# Patient Record
Sex: MALE | Race: WHITE | NOT HISPANIC OR LATINO | Employment: FULL TIME | URBAN - METROPOLITAN AREA
[De-identification: names, ages, dates, MRNs, and addresses within clinical notes are randomized per-mention and may not be internally consistent; named-entity substitution may affect disease eponyms.]

---

## 2024-05-14 ENCOUNTER — OFFICE VISIT (OUTPATIENT)
Dept: PRIMARY CARE | Facility: CLINIC | Age: 29
End: 2024-05-14
Payer: COMMERCIAL

## 2024-05-14 VITALS
OXYGEN SATURATION: 98 % | SYSTOLIC BLOOD PRESSURE: 118 MMHG | HEIGHT: 74 IN | RESPIRATION RATE: 16 BRPM | BODY MASS INDEX: 29.9 KG/M2 | DIASTOLIC BLOOD PRESSURE: 72 MMHG | HEART RATE: 60 BPM | WEIGHT: 233 LBS | TEMPERATURE: 98 F

## 2024-05-14 DIAGNOSIS — Z00.00 ROUTINE HEALTH MAINTENANCE: Primary | ICD-10-CM

## 2024-05-14 DIAGNOSIS — Z72.0 TOBACCO USE: ICD-10-CM

## 2024-05-14 DIAGNOSIS — N50.89 MASS OF RIGHT TESTIS: ICD-10-CM

## 2024-05-14 PROBLEM — H69.90 EUSTACHIAN TUBE DYSFUNCTION: Status: ACTIVE | Noted: 2024-05-14

## 2024-05-14 PROCEDURE — 99385 PREV VISIT NEW AGE 18-39: CPT | Performed by: STUDENT IN AN ORGANIZED HEALTH CARE EDUCATION/TRAINING PROGRAM

## 2024-05-14 ASSESSMENT — ENCOUNTER SYMPTOMS
LIGHT-HEADEDNESS: 0
DIZZINESS: 0
DIFFICULTY URINATING: 0
VOMITING: 0
NAUSEA: 0
MYALGIAS: 0
DIAPHORESIS: 0
CHILLS: 0
HEMATURIA: 0
DIARRHEA: 0
FEVER: 0
FLANK PAIN: 0
DYSURIA: 0
SHORTNESS OF BREATH: 0

## 2024-05-14 NOTE — PROGRESS NOTES
"Subjective   Patient ID: Brenton Boone is a 29 y.o. male who presents for Establish Care.  HPI    He is here to establish care.     Found lump on right testicle posteriorly. Noticed it a few weeks ago. It is not tender. He doesn't think it is growing. No hematospermia, hematuria, no pain with ejaculation.     PMHx: Reports pancreatitis in 2nd grade.  PSHx: Reports tonsillectomy 9 years ago.  FMHx: CHF in maternal grandmother (), HTN in father and hyperactive thyroid in mother. Denies history of prostate cancer or colon cancer in first degree relatives, his maternal grandmother may have had colon cancer at 85 but he is not sure. His siblings are healthy.    Review of Systems   Constitutional:  Negative for chills, diaphoresis and fever.   HENT:  Negative for ear pain and hearing loss.    Eyes:  Negative for visual disturbance.   Respiratory:  Negative for shortness of breath.    Cardiovascular:  Negative for chest pain.   Gastrointestinal:  Negative for diarrhea, nausea and vomiting.   Endocrine: Negative for cold intolerance and heat intolerance.   Genitourinary:  Negative for difficulty urinating, dysuria, flank pain, genital sores, hematuria, penile discharge, penile pain, penile swelling, scrotal swelling, testicular pain and urgency.   Musculoskeletal:  Negative for myalgias.   Skin:  Negative for rash.   Neurological:  Negative for dizziness and light-headedness.       /72   Pulse 60   Temp 36.7 °C (98 °F)   Resp 16   Ht 1.88 m (6' 2\")   Wt 106 kg (233 lb)   SpO2 98%   BMI 29.92 kg/m²     Objective   Physical Exam  Vitals reviewed.   Constitutional:       General: He is not in acute distress.     Appearance: Normal appearance.   HENT:      Head: Normocephalic.      Right Ear: Tympanic membrane, ear canal and external ear normal. There is no impacted cerumen.      Left Ear: Tympanic membrane, ear canal and external ear normal. There is no impacted cerumen.      Mouth/Throat:      Mouth: " Mucous membranes are moist.      Pharynx: Oropharynx is clear. No oropharyngeal exudate or posterior oropharyngeal erythema.   Cardiovascular:      Rate and Rhythm: Normal rate and regular rhythm.   Pulmonary:      Effort: Pulmonary effort is normal. No respiratory distress.      Breath sounds: Normal breath sounds.   Abdominal:      General: Bowel sounds are normal. There is no distension.      Palpations: Abdomen is soft. There is no mass.      Tenderness: There is no abdominal tenderness. There is no guarding or rebound.   Genitourinary:     Penis: Normal.       Comments: No inguinal hernias bilaterally.    Right superior testicular fullness present  Musculoskeletal:         General: No deformity.      Cervical back: Neck supple. No tenderness.   Lymphadenopathy:      Cervical: No cervical adenopathy.   Skin:     Coloration: Skin is not jaundiced.   Neurological:      Mental Status: He is alert.         Assessment/Plan   Problem List Items Addressed This Visit       Tobacco use    Mass of right testis    Relevant Orders    US scrotum    Referral to Urology    Routine health maintenance - Primary    Relevant Orders    Lipid Panel    Comprehensive Metabolic Panel    CBC    TSH with reflex to Free T4 if abnormal       Tobacco use  -He will work on cessation and f/u with me in 1-3 months if needed  I spent more than 3 minutes (but less than 10 minutes) counseling patient about need for smoking/tobacco cessation and how I can support efforts when patient is ready to quit.  Discussed nicotine replacement therapy, Varenicline, Bupropion, hypnosis, support groups, and acupunture as potential options.  Patient currently has no signs or symptoms of tobacco related disease.    Right testicular mass  -Scrotal u/s and refer to urology    Health Maintenance  -Prostate Cancer Screening: Age 52  -Vaccinations: TDAP done 2020, due 2030. Advised flu vaccine after labor day and covid booster.   -Lung Cancer Screening: Not  indicated  -AAA Screening: Not indicated  -Colonoscopy:Age 47  -Screen for HIV/Hep C: He deferred    CPE completed.  Advised to keep a heart healthy, low fat  diet with fruits and veggies like Mediterranean diet.  Advised on the importance of exercise and maintaining 150 minutes of exercise per week (30 minutes per day 5 days a week).  Advised on regular eye and dental visits.  Discussed age appropriate cancer screening, immunizations and recommendations given.  Discussed avoiding illicit drugs and tobacco. Advised on appropriate use of alcohol.  Advised to wear seat belt.    Works as . Works is going well. At home with lauren. Getting  in July. He runs and lifts.     F/u 1-3 months for tobacco cessation if needed  CPE 1 year  Fasting labs ordered

## 2024-05-21 ENCOUNTER — HOSPITAL ENCOUNTER (OUTPATIENT)
Dept: RADIOLOGY | Facility: CLINIC | Age: 29
Discharge: HOME | End: 2024-05-21
Payer: COMMERCIAL

## 2024-05-21 DIAGNOSIS — N50.89 MASS OF RIGHT TESTIS: ICD-10-CM

## 2024-05-21 PROCEDURE — 76870 US EXAM SCROTUM: CPT | Performed by: RADIOLOGY

## 2024-05-21 PROCEDURE — 76870 US EXAM SCROTUM: CPT

## 2024-05-23 DIAGNOSIS — N50.89 MASS OF RIGHT TESTIS: Primary | ICD-10-CM

## 2024-05-28 ENCOUNTER — APPOINTMENT (OUTPATIENT)
Dept: PRIMARY CARE | Facility: CLINIC | Age: 29
End: 2024-05-28
Payer: COMMERCIAL

## 2024-06-26 ENCOUNTER — OFFICE VISIT (OUTPATIENT)
Dept: UROLOGY | Facility: HOSPITAL | Age: 29
End: 2024-06-26
Payer: COMMERCIAL

## 2024-06-26 DIAGNOSIS — N50.89 MASS OF RIGHT TESTIS: ICD-10-CM

## 2024-06-26 DIAGNOSIS — N50.3 CYST, EPIDIDYMIS: Primary | ICD-10-CM

## 2024-06-26 LAB
POC APPEARANCE, URINE: CLEAR
POC BILIRUBIN, URINE: NEGATIVE
POC BLOOD, URINE: NEGATIVE
POC COLOR, URINE: YELLOW
POC GLUCOSE, URINE: NEGATIVE MG/DL
POC KETONES, URINE: NEGATIVE MG/DL
POC LEUKOCYTES, URINE: NEGATIVE
POC NITRITE,URINE: NEGATIVE
POC PH, URINE: 6.5 PH
POC PROTEIN, URINE: NEGATIVE MG/DL
POC SPECIFIC GRAVITY, URINE: 1.01
POC UROBILINOGEN, URINE: 0.2 EU/DL

## 2024-06-26 PROCEDURE — 81003 URINALYSIS AUTO W/O SCOPE: CPT | Mod: QW | Performed by: STUDENT IN AN ORGANIZED HEALTH CARE EDUCATION/TRAINING PROGRAM

## 2024-06-26 PROCEDURE — 99214 OFFICE O/P EST MOD 30 MIN: CPT | Performed by: STUDENT IN AN ORGANIZED HEALTH CARE EDUCATION/TRAINING PROGRAM

## 2024-06-26 PROCEDURE — 99204 OFFICE O/P NEW MOD 45 MIN: CPT | Performed by: STUDENT IN AN ORGANIZED HEALTH CARE EDUCATION/TRAINING PROGRAM

## 2024-06-26 NOTE — PROGRESS NOTES
"Subjective   Patient ID: Brenton Boone \"Rc\" is a 29 y.o. male    HPI  29 y.o. male who presented for evaluation of a lump felt during a self-exam. The patient reported feeling a lump during a yearly checkup, which prompted an ultrasound. The ultrasound revealed a small hydrocele and a small varicocele. The patient denies any significant pain or discomfort. The patient is not currently trying to conceive and does not report general anxiety about health. During the physical exam, a small epididymal cyst was palpated on the right side, which the patient did not find painful.    The most recent Scrotal US, conducted on 5/21/2024, revealed:  Small bilateral hydroceles.  Bilateral varicoceles.       Review of Systems    All systems were reviewed. Anything negative was noted in the HPI.    Objective   Physical Exam    General: Well developed, well nourished, alert and cooperative, appears in no acute distress   Eyes: Non-injected conjunctiva, sclera clear, no proptosis   Cardiac: Extremities are warm and well perfused. No edema, cyanosis or pallor   Lungs: Breathing is easy, non-labored. Speaking in clear and complete sentences. Normal diaphragmatic movement   MSK: Ambulatory with steady gait, unassisted   Neuro: Alert and oriented to person, place, and time   Psych: Demonstrates good judgment and reason, without hallucinations, abnormal affect or abnormal behaviors   Skin: No obvious lesions, no rashes       No CVA tenderness bilaterally   No suprapubic pain or discomfort       No past medical history on file.      Past Surgical History:   Procedure Laterality Date    OTHER SURGICAL HISTORY  07/02/2020    Tonsillectomy    OTHER SURGICAL HISTORY  07/02/2020    [place] tooth extraction           Assessment/Plan   Bilateral varicoceles with small bilateral hydroceles, right epididymal cyst     29 y.o. male who presents for the above condition, We had a very long and extensive discussion with the patient regarding the " pathophysiology, differential diagnosis, risk factor, management, natural history, incidence and diagnostic work-up of the condition.      We also discussed lifestyle modifications in the form of scrotal elevation, ice packing, and frequent ejaculation.     Plan:  - Follow up in 1 year with Scrotal US        6/26/2024    Scribe Attestation  By signing my name below, IRangel Scribe   attest that this documentation has been prepared under the direction and in the presence of Dr. Tad Ospina

## 2025-06-09 ENCOUNTER — APPOINTMENT (OUTPATIENT)
Dept: PRIMARY CARE | Facility: CLINIC | Age: 30
End: 2025-06-09
Payer: COMMERCIAL

## 2025-06-18 ENCOUNTER — HOSPITAL ENCOUNTER (OUTPATIENT)
Dept: RADIOLOGY | Facility: CLINIC | Age: 30
End: 2025-06-18
Payer: COMMERCIAL

## 2025-06-25 ENCOUNTER — APPOINTMENT (OUTPATIENT)
Dept: UROLOGY | Facility: HOSPITAL | Age: 30
End: 2025-06-25
Payer: COMMERCIAL